# Patient Record
Sex: FEMALE | URBAN - METROPOLITAN AREA
[De-identification: names, ages, dates, MRNs, and addresses within clinical notes are randomized per-mention and may not be internally consistent; named-entity substitution may affect disease eponyms.]

---

## 2024-09-06 ENCOUNTER — CLINICAL DOCUMENTATION (OUTPATIENT)
Dept: PSYCHIATRY | Age: 59
End: 2024-09-06
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
## 2024-10-01 ENCOUNTER — CASE MANAGEMENT (OUTPATIENT)
Dept: PSYCHIATRY | Age: 59
End: 2024-10-01
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
## 2024-10-14 ENCOUNTER — HOSPITAL ENCOUNTER (OUTPATIENT)
Dept: PSYCHIATRY | Age: 59
Setting detail: THERAPIES SERIES
Discharge: HOME | End: 2024-10-14

## 2024-10-18 ASSESSMENT — PATIENT HEALTH QUESTIONNAIRE - PHQ9
6. FEELING BAD ABOUT YOURSELF - OR THAT YOU ARE A FAILURE OR HAVE LET YOURSELF OR YOUR FAMILY DOWN: NEARLY EVERY DAY
SUM OF ALL RESPONSES TO PHQ QUESTIONS 1-9: 14
8. MOVING OR SPEAKING SO SLOWLY THAT OTHER PEOPLE COULD HAVE NOTICED. OR THE OPPOSITE, BEING SO FIGETY OR RESTLESS THAT YOU HAVE BEEN MOVING AROUND A LOT MORE THAN USUAL: NEARLY EVERY DAY
1. LITTLE INTEREST OR PLEASURE IN DOING THINGS: SEVERAL DAYS
10. IF YOU CHECKED OFF ANY PROBLEMS, HOW DIFFICULT HAVE THESE PROBLEMS MADE IT FOR YOU TO DO YOUR WORK, TAKE CARE OF THINGS AT HOME, OR GET ALONG WITH OTHER PEOPLE: SOMEWHAT DIFFICULT
9. THOUGHTS THAT YOU WOULD BE BETTER OFF DEAD, OR OF HURTING YOURSELF: NOT AT ALL
3. TROUBLE FALLING OR STAYING ASLEEP: SEVERAL DAYS
7. TROUBLE CONCENTRATING ON THINGS, SUCH AS READING THE NEWSPAPER OR WATCHING TELEVISION: NEARLY EVERY DAY
2. FEELING DOWN, DEPRESSED OR HOPELESS: SEVERAL DAYS
4. FEELING TIRED OR HAVING LITTLE ENERGY: NOT AT ALL
SUM OF ALL RESPONSES TO PHQ9 QUESTIONS 1 & 2: 2
5. POOR APPETITE OR OVEREATING: MORE THAN HALF THE DAYS

## 2024-10-18 ASSESSMENT — ANXIETY QUESTIONNAIRES
2. NOT BEING ABLE TO STOP OR CONTROL WORRYING: SEVERAL DAYS
IF YOU CHECKED OFF ANY PROBLEMS ON THIS QUESTIONNAIRE, HOW DIFFICULT HAVE THESE PROBLEMS MADE IT FOR YOU TO DO YOUR WORK, TAKE CARE OF THINGS AT HOME, OR GET ALONG WITH OTHER PEOPLE: SOMEWHAT DIFFICULT
6. BECOMING EASILY ANNOYED OR IRRITABLE: SEVERAL DAYS
4. TROUBLE RELAXING: NEARLY EVERY DAY
1. FEELING NERVOUS, ANXIOUS, OR ON EDGE: NEARLY EVERY DAY
3. WORRYING TOO MUCH ABOUT DIFFERENT THINGS: NEARLY EVERY DAY
5. BEING SO RESTLESS THAT IT IS HARD TO SIT STILL: NEARLY EVERY DAY
7. FEELING AFRAID AS IF SOMETHING AWFUL MIGHT HAPPEN: NEARLY EVERY DAY
GAD7 TOTAL SCORE: 17

## 2024-10-21 ENCOUNTER — CLINICAL DOCUMENTATION (OUTPATIENT)
Dept: PSYCHIATRY | Age: 59
End: 2024-10-21

## 2024-10-21 NOTE — PROGRESS NOTES
[facility] Follow up Telephone Note  EDD Ann   10/21/2024  10:30 am    Rachelle Reinoso  1965  [id number]    Time spent with Patient: 1 minutes    Clinician left voicemail canceling appointment due to assigned clinician being out. Clinician informed patient will be rescheduled by assigned clinician.

## 2024-10-28 ENCOUNTER — HOSPITAL ENCOUNTER (OUTPATIENT)
Dept: PSYCHIATRY | Age: 59
Setting detail: THERAPIES SERIES
Discharge: HOME | End: 2024-10-28

## 2024-10-31 NOTE — PROGRESS NOTES
Clinician was scheduled to complete pt's assessment on today.  However, pt presented to virtual session visibly upset evident by her continuous crying throughout the session.     Pt tearfully reports that she is experiencing thoughts that are telling her that she is \"worthless.\"  She stresses to clinician that she feels \"very lonely in this big house by myself and I don't know what to do about it.\"  Through tears she reports \"I have never had to do this before and it is so hard.\"  Clinician actively listens to pt while trying to offer words of encouragement when she cans.      Eventually, pt stops crying for a moment to allow clinician to assist her with some problem-solving.  One of the ideas pt verbalizes that she is going to try is by living out of one room in the home.  This will not only help with decreasing utility usage, but also with not feeling so alone in such a huge home.  Also, pt reports that she is going to try to get out and lean on her support a little more than she has been lately.      Clinician validates pt's feelings and shares with her that with intentional processing work and time, she will get on the other side of this hurtful situation.  Pt agrees with clinician and states \"I hope so.\"

## 2024-11-04 ENCOUNTER — HOSPITAL ENCOUNTER (OUTPATIENT)
Dept: PSYCHIATRY | Age: 59
Setting detail: THERAPIES SERIES
Discharge: HOME | End: 2024-11-04

## 2024-11-11 ENCOUNTER — HOSPITAL ENCOUNTER (OUTPATIENT)
Dept: PSYCHIATRY | Age: 59
Setting detail: THERAPIES SERIES
Discharge: HOME | End: 2024-11-11

## 2024-11-18 ENCOUNTER — HOSPITAL ENCOUNTER (OUTPATIENT)
Dept: PSYCHIATRY | Age: 59
Setting detail: THERAPIES SERIES
Discharge: HOME | End: 2024-11-18

## 2024-11-20 NOTE — PROGRESS NOTES
struggle with being alone in the home that her family shared for 20+ years.  She continues to express frustration and worry regarding her current financial state.  She requests food from [facility] food pantry.  Moreover, pt continues to reports feelings of sadness and anger regarding the way her son is handling parents separation.  Clinician continues to provide psycho-education on trauma and cognitive distortions.  Clinician continues to encourage pt to challenge her thoughts and feelings and identify what is fact and made up.  Pt agrees that she will continue working on it.   Pt reports that she continues to take her Ativan at night to assist her obtaining adequate sleep.  She reports \"sometimes I feel like it works, other times I don't know.\"     Pt denies any SI/HI.    Pt does report that she did not worry as much this week about becoming homeless.  She reports that she reflected on last week's discussion about catastrophizing and challenging her thoughts.        Chief Complaint / Diagnoses: Posttraumatic Stress Disorder 309.81 (F43.10)- unwanted stressful memories, nightmares, upset feelings when reminded, strong physical reactions, strong negative beliefs, blaming self, guilty feelings, fearfulness, difficulty concentrating, and inability to sleep, excessively worrying, isolation       History from Medical Record:    No past medical history on file.  Medications:   No current outpatient medications on file.     No current facility-administered medications for this encounter.       Social History:   Social History     Socioeconomic History    Marital status: Unknown     Spouse name: Not on file    Number of children: Not on file    Years of education: Not on file    Highest education level: Not on file   Occupational History    Not on file   Tobacco Use    Smoking status: Not on file    Smokeless tobacco: Not on file   Substance and Sexual Activity    Alcohol use: Not on file    Drug use: Not on file    Sexual

## 2024-12-02 ENCOUNTER — HOSPITAL ENCOUNTER (OUTPATIENT)
Dept: PSYCHIATRY | Age: 59
Setting detail: THERAPIES SERIES
Discharge: HOME | End: 2024-12-02

## 2024-12-04 NOTE — PROGRESS NOTES
.  Trauma [facility] Therapy Note  TRISHA Vasquez   12/4/2024    Rachelle Reinoso  1965  [id number]    Time spent with Patient: 60 minutes  Is this a daphne funded visit?: Yes - Insurance is not to be billed. Patient is seen under daphne funding.    Pt was provided informed consent for the [facility]. Discussed with patient model of service to include the limits of confidentiality (i.e. abuse reporting, suicide intervention, etc.) and short-term intervention focused approach.  Pt indicated understanding.    [facility] Inpatient or Virtual Question: This was not a virtual session      S:   \"I have been doing a lot of crying lately because I miss him.  I miss the way he used to make coffee and we would be out shopping during this time.\"    \"keeping my plans with my sister because my son is just now deciding to want to see me for [place].\"       O:      MSE:     Appearance:   Appropriate Dress, Good Hygiene, Good Eye Contact, and Tearful/Crying  Speech    normal rate, normal volume, and well articulated  Affect Observed   Depressed and Anxious  Thought Content    intact  Thought Process    linear  Associations    logical connections  Insight    Good  Judgment    Intact  Orientation    oriented to person, place, time, and general circumstances      Patient reports and/or exhibits the following symptoms:    Mood: Depression, Tearfulness, Sadness, Low self-esteem, and Anxiety    Cognitive symptoms: Appropriate to Context    Behaviors: Decreased concentration / ability to focus, Difficulty falling asleep, Difficulty staying asleep, Negative self-talk, and Successful response to coping skills    Somatic: None Reported          A:  Pt presented to session displaying a low mood and congruent affect.  She is alert & oriented x4.  She is engaged throughout session.  She tearfully reports to clinician \"I have been doing a lot of crying lately because I miss him.  I miss the way he used to make coffee and we

## 2024-12-09 ENCOUNTER — HOSPITAL ENCOUNTER (OUTPATIENT)
Dept: PSYCHIATRY | Age: 59
Setting detail: THERAPIES SERIES
Discharge: HOME | End: 2024-12-09

## 2024-12-23 ENCOUNTER — HOSPITAL ENCOUNTER (OUTPATIENT)
Dept: PSYCHIATRY | Age: 59
Setting detail: THERAPIES SERIES
Discharge: HOME | End: 2024-12-23

## 2024-12-30 ENCOUNTER — HOSPITAL ENCOUNTER (OUTPATIENT)
Dept: PSYCHIATRY | Age: 59
Setting detail: THERAPIES SERIES
Discharge: HOME | End: 2024-12-30

## 2025-01-06 ENCOUNTER — HOSPITAL ENCOUNTER (OUTPATIENT)
Dept: PSYCHIATRY | Age: 60
Setting detail: THERAPIES SERIES
Discharge: HOME | End: 2025-01-06
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
## 2025-01-27 ENCOUNTER — HOSPITAL ENCOUNTER (OUTPATIENT)
Dept: PSYCHIATRY | Age: 60
Setting detail: THERAPIES SERIES
Discharge: HOME | End: 2025-01-27

## 2025-01-29 NOTE — PROGRESS NOTES
.  [facility]  TRISHA Vasquez   2025    Rachelle Reinoso  1965  [id number]    Time spent with Patient: 60 minutes  Is this a daphne funded visit?: Yes - Insurance is not to be billed. Patient is seen under daphne funding.    Pt was provided informed consent for the [facility]. Discussed with patient model of service to include the limits of confidentiality (i.e. abuse reporting, suicide intervention, etc.) and short-term intervention focused approach.  Pt indicated understanding.    [facility] Inpatient or Virtual Question: This was a virtual session. Patient location is at their home address.  Location of clinician is at [facility] located at [address] [place] [address].         Pursuant to the emergency declaration under the Velez Act and the National Emergencies Act, 1135 waiver authority and the Coronavirus Preparedness and Response Supplemental Appropriations Act, this Virtual Visit was conducted, with patient's consent, to reduce the patient's risk of exposure to COVID-19 and provide continuity of care for an established patient.  Services were provided through a video synchronous discussion virtually to substitute for in-person clinic visit.       S:  \"I'm unworthy.  I'm 59 years old.  I should be taking care of myself.\"        O:      MSE:     Appearance:   Appropriate Dress, Good Eye Contact, and Tearful/Crying  Speech    normal rate, normal volume, and well articulated  Affect Observed   Appropriate to Context  Thought Content    intact  Thought Process    linear  Associations    logical connections  Insight    Good  Judgment    Intact  Orientation    oriented to person, place, time, and general circumstances      Patient reports and/or exhibits the following symptoms:    Mood: Appropriate to Context, Tearfulness, Sadness,  enjoyment in formerly pleasurable activities, Guilt, and Low self-esteem    Cognitive symptoms:

## 2025-02-10 ENCOUNTER — HOSPITAL ENCOUNTER (OUTPATIENT)
Dept: PSYCHIATRY | Age: 60
Setting detail: THERAPIES SERIES
Discharge: HOME | End: 2025-02-10

## 2025-02-10 ASSESSMENT — PATIENT HEALTH QUESTIONNAIRE - PHQ9
2. FEELING DOWN, DEPRESSED OR HOPELESS: SEVERAL DAYS
4. FEELING TIRED OR HAVING LITTLE ENERGY: SEVERAL DAYS
6. FEELING BAD ABOUT YOURSELF - OR THAT YOU ARE A FAILURE OR HAVE LET YOURSELF OR YOUR FAMILY DOWN: NEARLY EVERY DAY
9. THOUGHTS THAT YOU WOULD BE BETTER OFF DEAD, OR OF HURTING YOURSELF: NOT AT ALL
8. MOVING OR SPEAKING SO SLOWLY THAT OTHER PEOPLE COULD HAVE NOTICED. OR THE OPPOSITE, BEING SO FIGETY OR RESTLESS THAT YOU HAVE BEEN MOVING AROUND A LOT MORE THAN USUAL: SEVERAL DAYS
SUM OF ALL RESPONSES TO PHQ QUESTIONS 1-9: 11
3. TROUBLE FALLING OR STAYING ASLEEP: MORE THAN HALF THE DAYS
5. POOR APPETITE OR OVEREATING: NOT AT ALL
SUM OF ALL RESPONSES TO PHQ9 QUESTIONS 1 & 2: 1
7. TROUBLE CONCENTRATING ON THINGS, SUCH AS READING THE NEWSPAPER OR WATCHING TELEVISION: NEARLY EVERY DAY
SUM OF ALL RESPONSES TO PHQ QUESTIONS 1-9: 11
SUM OF ALL RESPONSES TO PHQ QUESTIONS 1-9: 11
1. LITTLE INTEREST OR PLEASURE IN DOING THINGS: NOT AT ALL
10. IF YOU CHECKED OFF ANY PROBLEMS, HOW DIFFICULT HAVE THESE PROBLEMS MADE IT FOR YOU TO DO YOUR WORK, TAKE CARE OF THINGS AT HOME, OR GET ALONG WITH OTHER PEOPLE: SOMEWHAT DIFFICULT
SUM OF ALL RESPONSES TO PHQ QUESTIONS 1-9: 11

## 2025-02-24 ENCOUNTER — HOSPITAL ENCOUNTER (OUTPATIENT)
Dept: PSYCHIATRY | Age: 60
Setting detail: THERAPIES SERIES
Discharge: HOME | End: 2025-02-24
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
## 2025-03-03 ENCOUNTER — HOSPITAL ENCOUNTER (OUTPATIENT)
Dept: PSYCHIATRY | Age: 60
Setting detail: THERAPIES SERIES
Discharge: HOME | End: 2025-03-03
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
